# Patient Record
Sex: MALE | Race: WHITE
[De-identification: names, ages, dates, MRNs, and addresses within clinical notes are randomized per-mention and may not be internally consistent; named-entity substitution may affect disease eponyms.]

---

## 2017-08-03 NOTE — EDM.PDOC
[id number]: RT LOWER BACK PAIN


Time Seen by Provider: 08/03/17 15:38


Source of Information: Reports: Patient


History Limitations: Reports: No Limitations





- History of Present Illness


INITIAL COMMENTS - FREE TEXT/NARRATIVE: 





pt has been wretching and vomiting  and after that he developed severe pain in 

the lower back area. 


Onset: Gradual


Duration: Hour(s):


Location: Reports: Back


Associated Symptoms: Reports: Nausea/Vomiting, Other (pt is having pain and he 

is not able to hold his oral meds down. )


  ** Right Lower Back


Pain Score (Numeric/FACES): 9





- Related Data


 Allergies











Allergy/AdvReac Type Severity Reaction Status Date / Time


 


No Known Allergies Allergy   Verified 03/10/14 15:35











Home Meds: 


 Home Meds





Cyclobenzaprine [Flexeril] 10 mg PO 03/10/14 [History]


Pregabalin [Lyrica] 150 mg pe PO TID 03/10/14 [History]


traMADol [Ultram] 50 mg pe PO QID 03/10/14 [History]


Omeprazole 40 mg PO BEDTIME #30 cap.sr 03/13/14 [Rx]











Past Medical History





- Past Surgical History


Other Musculoskeletal Surgeries/Procedures:: LOWER BACK REPAIR, "AROUND 12"





Social & Family History





- Tobacco Use


Smoking Status *Q: Unknown Ever Smoked


Years of Tobacco use: 40


Used Tobacco, but Quit: Yes


Month Tobacco Last Used: July 2013





- Alcohol Use


Days Per Week of Alcohol Use: 0





- Recreational Drug Use


Recreational Drug Use: No





ED ROS GENERAL





- Review of Systems


Review Of Systems: See Below


Constitutional: Reports: No Symptoms


HEENT: Reports: No Symptoms


Respiratory: Reports: No Symptoms


Cardiovascular: Reports: No Symptoms


Endocrine: Reports: No Symptoms


GI/Abdominal: Reports: Nausea, Vomiting, Other (pt does under stand why he he 

is vomiting. He is wretching and is making his back spasm and feel tight. He 

has a history of chronic back pain.  He is not haaving severe pain in his 

stomach. )


: Reports: No Symptoms


Musculoskeletal: Reports: No Symptoms


Skin: Reports: No Symptoms





ED EXAM,LOWER BACK PAIN/INJURY





- Physical Exam


Exam: See Below


Text/Narrative:: 





pt is very nauseated and has been vomiting. He has severe low back pain because 

of all of the wretching. 


Exam Limited By: No Limitations


General Appearance: Alert, Anxious


Ears: Normal TMs


Nose: Normal Inspection


Throat/Mouth: Normal Inspection


Head: Atraumatic


Neck: Normal Inspection


Respiratory/Chest: No Respiratory Distress


Cardiovascular: Regular Rate, Rhythm


GI/Abdominal: Soft


Rectal (Males) Exam: Deferred


Back Exam: Normal Inspection


Extremities: Normal Inspection


Neurological: Alert, Oriented x 3


Psychiatric: Anxious, Other (pt is having discomfort. )





Course





- Vital Signs


Last Recorded V/S: 


 Last Vital Signs











Temp  36.8 C   08/03/17 16:13


 


Pulse  103 H  08/03/17 16:13


 


Resp  15   08/03/17 16:13


 


BP  172/99 H  08/03/17 12:52


 


Pulse Ox  92 L  08/03/17 16:13














- Orders/Labs/Meds


Orders: 


 Active Orders 24 hr











 Category Date Time Status


 


 EKG Documentation Completion [RC] ASDIRECTED Care  08/03/17 16:35 Active


 


 EKG 12 Lead [EK] Routine Ther  08/03/17 16:35 Ordered











Meds: 


Medications














Discontinued Medications














Generic Name Dose Route Start Last Admin





  Trade Name Charanjit  PRN Reason Stop Dose Admin


 


Diazepam  4 mg  08/03/17 16:00  08/03/17 16:07





  Valium  IM  08/03/17 16:01  4 mg





  ONETIME ONE   Administration


 


Famotidine  20 mg  08/03/17 16:38  08/03/17 16:52





  Pepcid  PO  08/03/17 16:39  20 mg





  ONETIME ONE   Administration


 


Hydromorphone HCl  0.5 mg  08/03/17 15:38  08/03/17 16:06





  Dilaudid  IM  08/03/17 15:39  0.5 mg





  ONETIME ONE   Administration


 


Ondansetron HCl  4 mg  08/03/17 15:37  08/03/17 16:07





  Zofran Odt  PO  08/03/17 15:38  4 mg





  ONETIME ONE   Administration


 


Ondansetron HCl  4 mg  08/03/17 16:35  08/03/17 16:52





  Zofran Odt  PO  08/03/17 16:36  4 mg





  ONETIME ONE   Administration














- Re-Assessments/Exams


Free Text/Narrative Re-Assessment/Exam: 





08/03/17 16:41


pt was given valium 4mg im and dilaudid .5 mg im. He is feeling better in his 

back but is still very nauseated.  An ekg will be obtained. 


08/04/17 07:56


pt was given further zoforan and pepcid 20mg po. He was advised that if the 

vomiting did not settle down that he was to return for further workup. 





Departure





- Departure


Time of Disposition: 16:41


Disposition: Home, Self-Care 01


Condition: Fair


Clinical Impression: 


 Low back pain, Nausea & vomiting








- Discharge Information


Instructions:  Nausea, Adult, Back Pain, Adult


Referrals: 


PCP,None [Primary Care Provider] - 


Forms:  ED Department Discharge


Care Plan Goals: 


 pepcid 20mg bid for 1 week, zoforan 4mg  subling q6h prn for nausea, ice or 

heat to the low back.  use usual meds for back pain. 





- My Orders


Last 24 Hours: 


My Active Orders





08/03/17 16:35


EKG Documentation Completion [RC] ASDIRECTED 


EKG 12 Lead [EK] Routine 














- Assessment/Plan


Last 24 Hours: 


My Active Orders





08/03/17 16:35


EKG Documentation Completion [RC] ASDIRECTED 


EKG 12 Lead [EK] Routine

## 2021-08-14 ENCOUNTER — HOSPITAL ENCOUNTER (EMERGENCY)
Dept: HOSPITAL 11 - JP.ED | Age: 61
LOS: 1 days | Discharge: HOME | End: 2021-08-15
Payer: MEDICARE

## 2021-08-14 VITALS — HEART RATE: 75 BPM | SYSTOLIC BLOOD PRESSURE: 159 MMHG | DIASTOLIC BLOOD PRESSURE: 82 MMHG

## 2021-08-14 DIAGNOSIS — G89.29: ICD-10-CM

## 2021-08-14 DIAGNOSIS — Z79.899: ICD-10-CM

## 2021-08-14 DIAGNOSIS — K52.9: Primary | ICD-10-CM

## 2021-08-14 DIAGNOSIS — M54.5: ICD-10-CM

## 2021-08-14 PROCEDURE — 85025 COMPLETE CBC W/AUTO DIFF WBC: CPT

## 2021-08-14 PROCEDURE — 96374 THER/PROPH/DIAG INJ IV PUSH: CPT

## 2021-08-14 PROCEDURE — 96375 TX/PRO/DX INJ NEW DRUG ADDON: CPT

## 2021-08-14 PROCEDURE — 99284 EMERGENCY DEPT VISIT MOD MDM: CPT

## 2021-08-14 PROCEDURE — 36415 COLL VENOUS BLD VENIPUNCTURE: CPT

## 2021-08-14 NOTE — EDM.PDOC
<Deepak Renee - Last Filed: 08/14/21 19:55>





ED HPI GENERAL MEDICAL PROBLEM





- General


Chief Complaint: Back Pain or Injury


Stated Complaint: MEDICAL VIA [facility]


Time Seen by Provider: 08/14/21 19:55


Source of Information: Reports: Patient, EMS


History Limitations: Reports: No Limitations





- Related Data


                                    Allergies











Allergy/AdvReac Type Severity Reaction Status Date / Time


 


No Known Allergies Allergy   Verified 03/10/14 15:35











Home Meds: 


                                    Home Meds





Cyclobenzaprine [Flexeril] 10 mg PO QID PRN 03/10/14 [History]


Pregabalin [Lyrica] 150 mg pe PO TID 03/10/14 [History]


traMADol [Ultram] 50 mg pe PO QID 03/10/14 [History]


Omeprazole 40 mg PO BEDTIME #30 cap.sr 03/13/14 [Rx]


metFORMIN [Glucophage XR] 500 mg PO BIDMEALS 08/14/21 [History]











Past Medical History





- Past Surgical History


Other Musculoskeletal Surgeries/Procedures:: LOWER BACK REPAIR, "AROUND 12"





Departure





- Departure


Disposition: Home, Self-Care 01


Clinical Impression: 


 Gastroenteritis





Nausea & vomiting


Qualifiers:


 Vomiting type: unspecified Vomiting Intractability: non-intractable Qualified 

Code(s): R11.2 - Nausea with vomiting, unspecified





Low back pain


Qualifiers:


 Chronicity: chronic Back pain laterality: bilateral Sciatica presence: 

unspecified whether sciatica present Qualified Code(s): M54.5 - Low back pain; 

G89.29 - Other chronic pain








- Discharge Information


Instructions:  Nausea, Adult, Easy-to-Read


Referrals: 


PCP,None [Primary Care Provider] - 


Forms:  ED Department Discharge


Additional Instructions: 


zofran as needed for nausea


sips of fluids advance diet as tolerated


continue back pain medication as prescribed








<Yojana Gaffney - Last Filed: 08/14/21 23:18>





ED HPI GENERAL MEDICAL PROBLEM





- General


Source of Information: Reports: Patient


History Limitations: Reports: No Limitations





- History of Present Illness


INITIAL COMMENTS - FREE TEXT/NARRATIVE: 





60 yo male presents via EMS with nausea and vomiting along with back pain.  

Acute on chronic back pain.  He has had multiple episodes of vomiting throughout

the day.  He was unable to tolerate his home pain management medications.  He 

does also use cannabis for pain control.  he has had multiple soft stools this 

evening.





ED ROS GENERAL





- Review of Systems


Review Of Systems: See Below


Constitutional: Denies: Fever, Chills


Respiratory: Denies: Shortness of Breath, Wheezing


Cardiovascular: Denies: Chest Pain


GI/Abdominal: Reports: Abdominal Pain, Nausea, Vomiting.  Denies: Black Stool, 

Bloody Stool


: Denies: Dysuria


Musculoskeletal: Reports: Back Pain


Skin: Denies: Rash





ED EXAM,LOWER BACK PAIN/INJURY





- Physical Exam


Exam: See Below


Exam Limited By: No Limitations


General Appearance: Alert, WD/WN, Mild Distress


Respiratory/Chest: No Respiratory Distress, Lungs Clear, Normal Breath Sounds.  

No: Crackles, Rhonchi, Wheezing


Cardiovascular: Regular Rate, Rhythm, No Murmur


GI/Abdominal: Soft, No Organomegaly, No Distention, Tender (generalized)


Back Exam: Muscle Spasm, Paraspinal Tenderness (lumbar)


Neurological: Alert, Normal Mood/Affect


Psychiatric: Normal Affect, Normal Mood


Skin Exam: Warm, Dry, Intact





Course





- Vital Signs


Last Recorded V/S: 


                                Last Vital Signs











Temp  36.2 C   08/14/21 19:53


 


Pulse  75   08/14/21 22:44


 


Resp  20   08/14/21 22:44


 


BP  159/82 H  08/14/21 22:44


 


Pulse Ox  96   08/14/21 22:44














- Orders/Labs/Meds


Orders: 


                               Active Orders 24 hr











 Category Date Time Status


 


 Sodium Chloride 0.9% [Normal Saline] 1,000 ml Med  08/14/21 22:00 Active





 IV ASDIRECTED   


 


 Sodium Chloride 0.9% [Saline Flush] Med  08/14/21 19:57 Active





 10 ml FLUSH ASDIRECTED PRN   


 


 Saline Lock Insert [OM.PC] Routine Oth  08/14/21 19:57 Ordered








                                Medication Orders





Sodium Chloride (Normal Saline)  1,000 mls @ 999 mls/hr IV ASDIRECTED WAYLON


   Last Admin: 08/14/21 22:41  Dose: 999 mls/hr


   Documented by: JOHNNY


Sodium Chloride (Sodium Chloride 0.9% 10 Ml Syringe)  10 ml FLUSH ASDIRECTED PRN


   PRN Reason: Keep Vein Open








Labs: 


                                Laboratory Tests











  08/14/21 Range/Units





  22:00 


 


WBC  12.6 H  (4.5-11.0)  K/uL


 


RBC  5.02  (4.30-5.90)  M/uL


 


Hgb  15.1 H  (12.0-15.0)  g/dL


 


Hct  44.5  (40.0-54.0)  %


 


MCV  89  (80-98)  fL


 


MCH  30  (27-31)  pg


 


MCHC  34  (32-36)  %


 


Plt Count  308  (150-400)  K/uL


 


Neut % (Auto)  84.2 H  (36-66)  %


 


Lymph % (Auto)  9.9 L  (24-44)  %


 


Mono % (Auto)  5.6  (2-6)  %


 


Eos % (Auto)  0.1 L  (2-4)  %


 


Baso % (Auto)  0.2  (0-1)  %











Meds: 


Medications











Generic Name Dose Route Start Last Admin





  Trade Name Freq  PRN Reason Stop Dose Admin


 


Sodium Chloride  1,000 mls @ 999 mls/hr  08/14/21 22:00  08/14/21 22:41





  Normal Saline  IV   999 mls/hr





  ASDIRECTED WAYLON   Administration


 


Sodium Chloride  10 ml  08/14/21 19:57 





  Sodium Chloride 0.9% 10 Ml Syringe  FLUSH  





  ASDIRECTED PRN  





  Keep Vein Open  














Discontinued Medications














Generic Name Dose Route Start Last Admin





  Trade Name Freq  PRN Reason Stop Dose Admin


 


Droperidol  1.25 mg  08/14/21 19:57  08/14/21 21:06





  Droperidol 5 Mg/2 Ml Sdv  IVPUSH  08/14/21 19:58  1.25 mg





  ONETIME ONE   Administration


 


Hydromorphone HCl  1 mg  08/14/21 19:57  08/14/21 21:10





  Hydromorphone 1 Mg/Ml Syringe  IVPUSH  08/14/21 19:58  1 mg





  ONETIME ONE   Administration


 


Ketorolac Tromethamine  30 mg  08/14/21 21:47  08/14/21 22:00





  Ketorolac 30 Mg/Ml Sdv  IVPUSH  08/14/21 21:48  30 mg





  ONETIME ONE   Administration


 


Metoclopramide HCl  5 mg  08/14/21 21:48  08/14/21 22:01





  Metoclopramide 10 Mg/2 Ml Sdv  IVPUSH  08/14/21 21:49  5 mg





  ONETIME ONE   Administration














- Re-Assessments/Exams


Free Text/Narrative Re-Assessment/Exam: 





08/14/21 23:15


60 yo presented to ER via ambulance in moderate distress.  He has chronic back 

pain and c/o acute on chronic and nausea with vomiting. he was medicated for 

pain and nausea.  He did have 2 large soft bowel movements in the ER.  He was 

given IV fluids and medicated for n/v.  He is able to reposition himself in bed.

  has not vomited over the last 2 hours in ER.  tolerating ice chips.  will DC 

home with zofran





Departure





- Departure


Time of Disposition: 23:08


Condition: Good





- Discharge Information


*PRESCRIPTION DRUG MONITORING PROGRAM REVIEWED*: Not Applicable


*COPY OF PRESCRIPTION DRUG MONITORING REPORT IN PATIENT JESSIE: Not Applicable





Sepsis Event Note (ED)





- Focused Exam


Vital Signs: 


                                   Vital Signs











  Temp Pulse Resp BP Pulse Ox


 


 08/14/21 22:44   75  20  159/82 H  96


 


 08/14/21 21:00   67  20  169/98 H  95


 


 08/14/21 19:53  36.2 C  86  20  152/78 H  96


 


 08/14/21 19:43  36.1 C  67  20  185/87 H  96














- My Orders


Last 24 Hours: 


My Active Orders





08/14/21 22:00


Sodium Chloride 0.9% [Normal Saline] 1,000 ml IV ASDIRECTED 














- Assessment/Plan


Last 24 Hours: 


My Active Orders





08/14/21 22:00


Sodium Chloride 0.9% [Normal Saline] 1,000 ml IV ASDIRECTED

## 2022-01-17 ENCOUNTER — HOSPITAL ENCOUNTER (EMERGENCY)
Dept: HOSPITAL 11 - JP.ED | Age: 62
LOS: 1 days | Discharge: HOME | End: 2022-01-18
Payer: MEDICAID

## 2022-01-17 VITALS — HEART RATE: 101 BPM

## 2022-01-17 DIAGNOSIS — G89.29: ICD-10-CM

## 2022-01-17 DIAGNOSIS — E11.9: ICD-10-CM

## 2022-01-17 DIAGNOSIS — R11.2: Primary | ICD-10-CM

## 2022-01-17 DIAGNOSIS — Z79.84: ICD-10-CM

## 2022-01-17 DIAGNOSIS — M54.50: ICD-10-CM

## 2022-01-17 DIAGNOSIS — Z79.899: ICD-10-CM

## 2022-01-17 DIAGNOSIS — K21.9: ICD-10-CM

## 2022-01-17 PROCEDURE — 84484 ASSAY OF TROPONIN QUANT: CPT

## 2022-01-17 PROCEDURE — 74177 CT ABD & PELVIS W/CONTRAST: CPT

## 2022-01-17 PROCEDURE — 80053 COMPREHEN METABOLIC PANEL: CPT

## 2022-01-17 PROCEDURE — 83690 ASSAY OF LIPASE: CPT

## 2022-01-17 PROCEDURE — 83605 ASSAY OF LACTIC ACID: CPT

## 2022-01-17 PROCEDURE — 36415 COLL VENOUS BLD VENIPUNCTURE: CPT

## 2022-01-17 PROCEDURE — 96374 THER/PROPH/DIAG INJ IV PUSH: CPT

## 2022-01-17 PROCEDURE — 85025 COMPLETE CBC W/AUTO DIFF WBC: CPT

## 2022-01-17 PROCEDURE — 96376 TX/PRO/DX INJ SAME DRUG ADON: CPT

## 2022-01-17 PROCEDURE — 96375 TX/PRO/DX INJ NEW DRUG ADDON: CPT

## 2022-01-17 PROCEDURE — 99285 EMERGENCY DEPT VISIT HI MDM: CPT
